# Patient Record
Sex: MALE | Race: WHITE | NOT HISPANIC OR LATINO | Employment: FULL TIME | URBAN - METROPOLITAN AREA
[De-identification: names, ages, dates, MRNs, and addresses within clinical notes are randomized per-mention and may not be internally consistent; named-entity substitution may affect disease eponyms.]

---

## 2017-07-21 ENCOUNTER — OFFICE VISIT (OUTPATIENT)
Dept: URGENT CARE | Facility: CLINIC | Age: 62
End: 2017-07-21
Payer: COMMERCIAL

## 2017-07-21 VITALS
WEIGHT: 234.5 LBS | BODY MASS INDEX: 32.83 KG/M2 | DIASTOLIC BLOOD PRESSURE: 84 MMHG | OXYGEN SATURATION: 99 % | HEIGHT: 71 IN | SYSTOLIC BLOOD PRESSURE: 140 MMHG | RESPIRATION RATE: 17 BRPM | TEMPERATURE: 98 F | HEART RATE: 105 BPM

## 2017-07-21 DIAGNOSIS — K21.9 GASTROESOPHAGEAL REFLUX DISEASE, ESOPHAGITIS PRESENCE NOT SPECIFIED: Primary | ICD-10-CM

## 2017-07-21 PROCEDURE — 99213 OFFICE O/P EST LOW 20 MIN: CPT | Mod: S$GLB,,, | Performed by: FAMILY MEDICINE

## 2017-07-21 NOTE — PROGRESS NOTES
"Subjective:       Patient ID: Tony Morin is a 62 y.o. male.    Vitals:  height is 5' 11" (1.803 m) and weight is 106.4 kg (234 lb 8 oz). His tympanic temperature is 98 °F (36.7 °C). His blood pressure is 140/84 (abnormal) and his pulse is 105. His respiration is 17 and oxygen saturation is 99%.     Chief Complaint: Gastroesophageal Reflux    Gastroesophageal Reflux   He reports no abdominal pain, no chest pain, no choking, no coughing, no hoarse voice, no nausea or no sore throat. This is a recurrent problem. The current episode started in the past 7 days. The problem occurs frequently. The problem has been unchanged. The symptoms are aggravated by certain foods and lying down. Pertinent negatives include no fatigue, muscle weakness or weight loss. There are no known risk factors. He has tried an antacid for the symptoms. The treatment provided significant relief. Past invasive treatments do not include reflux surgery.     Review of Systems   Constitution: Negative for chills, fatigue, fever and weight loss.   HENT: Negative for headaches, hoarse voice and sore throat.    Eyes: Negative for blurred vision.   Cardiovascular: Negative for chest pain.   Respiratory: Negative for choking, cough and shortness of breath.    Skin: Negative for rash.   Musculoskeletal: Negative for back pain, joint pain and muscle weakness.   Gastrointestinal: Negative for abdominal pain, diarrhea, nausea and vomiting.   Psychiatric/Behavioral: The patient is not nervous/anxious.        Objective:      Physical Exam   Constitutional: He appears well-developed.   Cardiovascular: Regular rhythm and normal heart sounds.  Tachycardia present.  Exam reveals no S3.    Pulmonary/Chest: Effort normal and breath sounds normal.   Abdominal: Soft. Normal appearance and bowel sounds are normal.   Neurological: He is alert.       Assessment:       1. Gastroesophageal reflux disease, esophagitis presence not specified        Plan:     "     Gastroesophageal reflux disease, esophagitis presence not specified    protonix 40 mg prn

## 2017-07-23 RX ORDER — PANTOPRAZOLE SODIUM 40 MG/1
40 TABLET, DELAYED RELEASE ORAL DAILY
Qty: 30 TABLET | Refills: 1 | Status: SHIPPED | OUTPATIENT
Start: 2017-07-23 | End: 2022-12-29

## 2017-12-05 ENCOUNTER — OFFICE VISIT (OUTPATIENT)
Dept: URGENT CARE | Facility: CLINIC | Age: 62
End: 2017-12-05
Payer: COMMERCIAL

## 2017-12-05 VITALS
DIASTOLIC BLOOD PRESSURE: 81 MMHG | HEIGHT: 71 IN | SYSTOLIC BLOOD PRESSURE: 120 MMHG | HEART RATE: 78 BPM | BODY MASS INDEX: 33.2 KG/M2 | OXYGEN SATURATION: 96 % | WEIGHT: 237.13 LBS

## 2017-12-05 DIAGNOSIS — S99.922A INJURY OF TOE ON LEFT FOOT, INITIAL ENCOUNTER: Primary | ICD-10-CM

## 2017-12-05 DIAGNOSIS — S92.535A CLOSED NONDISPLACED FRACTURE OF DISTAL PHALANX OF LESSER TOE OF LEFT FOOT, INITIAL ENCOUNTER: ICD-10-CM

## 2017-12-05 PROCEDURE — 99213 OFFICE O/P EST LOW 20 MIN: CPT | Mod: S$GLB,,, | Performed by: EMERGENCY MEDICINE

## 2017-12-05 NOTE — PROGRESS NOTES
"Subjective:       Patient ID: Tony Morin is a 62 y.o. male.    Vitals:  height is 5' 11" (1.803 m) and weight is 107.5 kg (237 lb 1.6 oz). His blood pressure is 120/81 and his pulse is 78. His oxygen saturation is 96%.     Chief Complaint: Toe Injury (2nd digit/left foot)    Toe Injury   This is a new problem. The current episode started yesterday. Associated symptoms include joint swelling. Pertinent negatives include no abdominal pain, chest pain, chills, fever, headaches, nausea, rash, sore throat or vomiting. The symptoms are aggravated by bending. He has tried nothing for the symptoms.     Review of Systems   Constitution: Negative for chills and fever.   HENT: Negative for sore throat.    Eyes: Negative for blurred vision.   Cardiovascular: Negative for chest pain.   Respiratory: Negative for shortness of breath.    Skin: Positive for color change. Negative for rash.   Musculoskeletal: Positive for joint pain (left foot), joint swelling and stiffness. Negative for back pain.   Gastrointestinal: Negative for abdominal pain, diarrhea, nausea and vomiting.   Neurological: Negative for headaches.   Psychiatric/Behavioral: The patient is not nervous/anxious.        Objective:      Physical Exam   Constitutional: He is oriented to person, place, and time. He appears well-developed and well-nourished. He is cooperative.  Non-toxic appearance. He does not appear ill. No distress.   HENT:   Head: Normocephalic and atraumatic.   Right Ear: Hearing, tympanic membrane, external ear and ear canal normal.   Left Ear: Hearing, tympanic membrane, external ear and ear canal normal.   Nose: No mucosal edema, rhinorrhea or nasal deformity. No epistaxis. Right sinus exhibits no maxillary sinus tenderness and no frontal sinus tenderness. Left sinus exhibits no maxillary sinus tenderness and no frontal sinus tenderness.   Mouth/Throat: Uvula is midline and mucous membranes are normal. No trismus in the jaw. Normal " dentition. No uvula swelling. No posterior oropharyngeal erythema.   Eyes: Conjunctivae and lids are normal. Right eye exhibits no discharge. Left eye exhibits no discharge. No scleral icterus.   Sclera clear bilat   Neck: Trachea normal, full passive range of motion without pain and phonation normal.   Cardiovascular: Normal rate, regular rhythm, normal heart sounds, intact distal pulses and normal pulses.    Pulmonary/Chest: Effort normal. No respiratory distress.   Abdominal: Normal appearance. He exhibits no pulsatile midline mass.   Musculoskeletal: Normal range of motion. He exhibits edema. He exhibits no deformity.        Left foot: There is tenderness and bony tenderness.        Feet:    Swelling tender distal phalanx L 2nd toe.   Feet:   Left Foot:   Skin Integrity: Positive for erythema.   Neurological: He is alert and oriented to person, place, and time. He exhibits normal muscle tone. Coordination normal.   Skin: Skin is warm, dry and intact. He is not diaphoretic. No pallor.   Psychiatric: He has a normal mood and affect. His speech is normal and behavior is normal. Judgment and thought content normal. Cognition and memory are normal.   Nursing note and vitals reviewed.      Assessment:       1. Injury of toe on left foot, initial encounter    2. Closed nondisplaced fracture of distal phalanx of lesser toe of left foot, initial encounter        Plan:         Injury of toe on left foot, initial encounter  -     X-Ray Toe 2 or More Views Left; Future; Expected date: 12/05/2017    Closed nondisplaced fracture of distal phalanx of lesser toe of left foot, initial encounter

## 2017-12-08 ENCOUNTER — TELEPHONE (OUTPATIENT)
Dept: URGENT CARE | Facility: CLINIC | Age: 62
End: 2017-12-08

## 2019-09-05 ENCOUNTER — OFFICE VISIT (OUTPATIENT)
Dept: URGENT CARE | Facility: CLINIC | Age: 64
End: 2019-09-05
Payer: COMMERCIAL

## 2019-09-05 VITALS
SYSTOLIC BLOOD PRESSURE: 114 MMHG | TEMPERATURE: 98 F | WEIGHT: 226.31 LBS | BODY MASS INDEX: 30.65 KG/M2 | HEART RATE: 92 BPM | OXYGEN SATURATION: 97 % | DIASTOLIC BLOOD PRESSURE: 76 MMHG | HEIGHT: 72 IN

## 2019-09-05 DIAGNOSIS — S89.91XA LEG INJURY, RIGHT, INITIAL ENCOUNTER: Primary | ICD-10-CM

## 2019-09-05 PROCEDURE — 3008F PR BODY MASS INDEX (BMI) DOCUMENTED: ICD-10-PCS | Mod: CPTII,S$GLB,, | Performed by: PHYSICIAN ASSISTANT

## 2019-09-05 PROCEDURE — 73552 XR FEMUR 2 VIEW RIGHT: ICD-10-PCS | Mod: RT,S$GLB,, | Performed by: RADIOLOGY

## 2019-09-05 PROCEDURE — 73552 X-RAY EXAM OF FEMUR 2/>: CPT | Mod: RT,S$GLB,, | Performed by: RADIOLOGY

## 2019-09-05 PROCEDURE — 99214 PR OFFICE/OUTPT VISIT, EST, LEVL IV, 30-39 MIN: ICD-10-PCS | Mod: S$GLB,,, | Performed by: PHYSICIAN ASSISTANT

## 2019-09-05 PROCEDURE — 99214 OFFICE O/P EST MOD 30 MIN: CPT | Mod: S$GLB,,, | Performed by: PHYSICIAN ASSISTANT

## 2019-09-05 PROCEDURE — 3008F BODY MASS INDEX DOCD: CPT | Mod: CPTII,S$GLB,, | Performed by: PHYSICIAN ASSISTANT

## 2019-09-05 RX ORDER — PREDNISONE 20 MG/1
TABLET ORAL
Qty: 20 TABLET | Refills: 0 | Status: SHIPPED | OUTPATIENT
Start: 2019-09-05 | End: 2022-01-26

## 2019-09-05 NOTE — PROGRESS NOTES
"Subjective:       Patient ID: Tony Morin is a 64 y.o. male.    Vitals:  height is 5' 11.5" (1.816 m) and weight is 102.7 kg (226 lb 4.8 oz). His oral temperature is 97.5 °F (36.4 °C). His blood pressure is 114/76 and his pulse is 92. His oxygen saturation is 97%.     Chief Complaint: Leg Injury    X1 week ago pt states he hit his right hip on the corner of a table. Pt states x6 days ago pt states pain from right hip to right buttock that shoots down to mid right leg and makes his right leg give out. Pt also complaints of pain with walking,and weakness. NO OTC medication today. Pt concerned for possible nerve damage.     Leg Pain    The incident occurred 5 to 7 days ago. The injury mechanism was a direct blow. The pain is present in the right hip, right leg and right thigh. The quality of the pain is described as shooting. The pain is at a severity of 8/10. The pain is moderate. The pain has been fluctuating since onset. Associated symptoms include an inability to bear weight and muscle weakness. He reports no foreign bodies present. The symptoms are aggravated by movement and weight bearing. The treatment provided no relief.       Constitution: Negative for chills, fatigue and fever.   HENT: Negative for congestion and sore throat.    Neck: Negative for painful lymph nodes.   Cardiovascular: Negative for chest pain and leg swelling.   Eyes: Negative for double vision and blurred vision.   Respiratory: Negative for cough and shortness of breath.    Gastrointestinal: Negative for nausea, vomiting and diarrhea.   Genitourinary: Negative for dysuria, frequency and urgency.   Musculoskeletal: Positive for pain and pain with walking. Negative for joint pain, joint swelling, muscle cramps and muscle ache.   Skin: Negative for color change, pale, rash and erythema.   Allergic/Immunologic: Negative for seasonal allergies.   Neurological: Negative for dizziness, history of vertigo, light-headedness, passing out and " headaches.   Hematologic/Lymphatic: Negative for swollen lymph nodes, easy bruising/bleeding and history of blood clots. Does not bruise/bleed easily.   Psychiatric/Behavioral: Negative for nervous/anxious, sleep disturbance and depression. The patient is not nervous/anxious.        Objective:      Physical Exam   Constitutional: He is oriented to person, place, and time. He appears well-developed and well-nourished. No distress.   HENT:   Head: Normocephalic and atraumatic.   Right Ear: External ear normal.   Left Ear: External ear normal.   Nose: Nose normal.   Eyes: Conjunctivae, EOM and lids are normal. Right eye exhibits no discharge. Left eye exhibits no discharge.   Neck: Trachea normal, full passive range of motion without pain and phonation normal.   Cardiovascular:   Pulses:       Posterior tibial pulses are 2+ on the right side, and 2+ on the left side.   Pulmonary/Chest: Effort normal. No respiratory distress.   Musculoskeletal: Normal range of motion.        Legs:  TTP. Antalgic gate.   Neurological: He is alert and oriented to person, place, and time. No sensory deficit.   Skin: Skin is warm, dry and intact. No rash noted. He is not diaphoretic. No erythema.   Psychiatric: He has a normal mood and affect. His speech is normal and behavior is normal. Judgment and thought content normal. Cognition and memory are normal.   Nursing note and vitals reviewed.      Assessment:       1. Leg injury, right, initial encounter        Plan:         Leg injury, right, initial encounter  -     XR FEMUR 2 VIEW RIGHT; Future; Expected date: 09/05/2019  Xr Femur 2 View Right    Result Date: 9/5/2019  EXAMINATION: XR FEMUR 2 VIEW RIGHT CLINICAL HISTORY: Unspecified injury of right lower leg, initial encounter TECHNIQUE: AP and lateral views of the right femur were performed. COMPARISON: None FINDINGS: Four views right femur. The right femoroacetabular joint appears intact.  No acute displaced fracture or dislocation of  the femur.  The knee appears intact.  No large knee joint effusion.     1. No acute displaced fracture or dislocation of the right femur. Electronically signed by: Yosi Alejnadra MD Date:    09/05/2019 Time:    15:01    -     Ambulatory referral/consult to Orthopedics    Other orders  -     predniSONE (DELTASONE) 20 MG tablet; Take 40mg for 2days, take 30mg for 2 days, take 20mg for 2 days, take 10mg for 2 days  Dispense: 20 tablet; Refill: 0    Discussed regular Ibuprofen and will do steroid taper. Will place referral for Ortho. Advised if no improvement in 2 weeks will need to follow up with Ortho. Patient states understanding and agrees with plan.     You must understand that you've received an Urgent Care treatment only and that you may be released before all your medical problems are known or treated. You, the patient, will arrange for follow up care as instructed.  Follow up with your PCP or specialty clinic as directed in the next 1-2 weeks if not improved or as needed.  You can call [phone] to schedule an appointment with the appropriate provider.  If your condition worsens we recommend that you receive another evaluation at the emergency room immediately or contact your primary medical clinics after hours call service to discuss your concerns.  Please return here or go to the Emergency Department for any concerns or worsening of condition.

## 2019-09-05 NOTE — PATIENT INSTRUCTIONS

## 2019-09-06 ENCOUNTER — TELEPHONE (OUTPATIENT)
Dept: ORTHOPEDICS | Facility: CLINIC | Age: 64
End: 2019-09-06

## 2019-09-08 ENCOUNTER — TELEPHONE (OUTPATIENT)
Dept: URGENT CARE | Facility: CLINIC | Age: 64
End: 2019-09-08

## 2019-09-16 ENCOUNTER — TELEPHONE (OUTPATIENT)
Dept: ORTHOPEDICS | Facility: CLINIC | Age: 64
End: 2019-09-16

## 2019-09-16 NOTE — TELEPHONE ENCOUNTER
----- Message from Nargis Napoleon sent at 9/16/2019  8:21 AM CDT -----  Contact: patient  Type: Needs Medical Advice    Who Called:  pt  Symptoms (please be specific):  Still having right leg  How long has patient had these symptoms:  Week and half    Best Call Back Number:   Additional Information: Requesting a call back from Nurse, regarding a sooner appt or advice

## 2019-09-16 NOTE — TELEPHONE ENCOUNTER
Called pt back. Advised that Dr. Butcher's first available appt is Thursday. Advised pt that if there were any cancellations for tomorrow, that I would call him. Thanks, Marva

## 2019-09-19 ENCOUNTER — OFFICE VISIT (OUTPATIENT)
Dept: ORTHOPEDICS | Facility: CLINIC | Age: 64
End: 2019-09-19
Payer: COMMERCIAL

## 2019-09-19 VITALS
BODY MASS INDEX: 30.61 KG/M2 | WEIGHT: 226 LBS | SYSTOLIC BLOOD PRESSURE: 128 MMHG | DIASTOLIC BLOOD PRESSURE: 85 MMHG | HEIGHT: 72 IN | HEART RATE: 89 BPM

## 2019-09-19 DIAGNOSIS — S76.111A RUPTURE OF RIGHT QUADRICEPS MUSCLE, INITIAL ENCOUNTER: Primary | ICD-10-CM

## 2019-09-19 PROCEDURE — 99999 PR PBB SHADOW E&M-EST. PATIENT-LVL III: CPT | Mod: PBBFAC,,, | Performed by: ORTHOPAEDIC SURGERY

## 2019-09-19 PROCEDURE — 99204 PR OFFICE/OUTPT VISIT, NEW, LEVL IV, 45-59 MIN: ICD-10-PCS | Mod: S$GLB,,, | Performed by: ORTHOPAEDIC SURGERY

## 2019-09-19 PROCEDURE — 99999 PR PBB SHADOW E&M-EST. PATIENT-LVL III: ICD-10-PCS | Mod: PBBFAC,,, | Performed by: ORTHOPAEDIC SURGERY

## 2019-09-19 PROCEDURE — 3008F BODY MASS INDEX DOCD: CPT | Mod: CPTII,S$GLB,, | Performed by: ORTHOPAEDIC SURGERY

## 2019-09-19 PROCEDURE — 3008F PR BODY MASS INDEX (BMI) DOCUMENTED: ICD-10-PCS | Mod: CPTII,S$GLB,, | Performed by: ORTHOPAEDIC SURGERY

## 2019-09-19 PROCEDURE — 1101F PT FALLS ASSESS-DOCD LE1/YR: CPT | Mod: CPTII,S$GLB,, | Performed by: ORTHOPAEDIC SURGERY

## 2019-09-19 PROCEDURE — 99204 OFFICE O/P NEW MOD 45 MIN: CPT | Mod: S$GLB,,, | Performed by: ORTHOPAEDIC SURGERY

## 2019-09-19 PROCEDURE — 1101F PR PT FALLS ASSESS DOC 0-1 FALLS W/OUT INJ PAST YR: ICD-10-PCS | Mod: CPTII,S$GLB,, | Performed by: ORTHOPAEDIC SURGERY

## 2019-09-19 RX ORDER — NAPROXEN SODIUM 220 MG
440 TABLET ORAL 2 TIMES DAILY PRN
COMMUNITY
End: 2019-09-19

## 2019-09-19 RX ORDER — MELOXICAM 15 MG/1
15 TABLET ORAL DAILY
Qty: 30 TABLET | Refills: 6 | Status: SHIPPED | OUTPATIENT
Start: 2019-09-19 | End: 2022-01-26

## 2019-09-19 RX ORDER — METHOCARBAMOL 750 MG/1
750 TABLET, FILM COATED ORAL 4 TIMES DAILY PRN
Qty: 44 TABLET | Refills: 0 | Status: SHIPPED | OUTPATIENT
Start: 2019-09-19 | End: 2019-09-29

## 2019-09-19 NOTE — LETTER
September 20, 2019      KANIKA Fu  [address] [facility] [address]           [facility]  [address] [facility] [address] [place] [address]  Phone: [phone]          Patient: Tony Morin   MR Number: [id number]   YOB: 1955   Date of Visit: 9/19/2019       Dear Annabel Javier:    Thank you for referring Tony Morin to me for evaluation. Attached you will find relevant portions of my assessment and plan of care.    If you have questions, please do not hesitate to call me. I look forward to following Tony Morin along with you.    Sincerely,    Lambert Butcher MD    Enclosure  CC:  No Recipients    If you would like to receive this communication electronically, please contact [email or web address] or [phone] to request more information on [employer] Link access.    For providers and/or their staff who would like to refer a patient to [facility], please contact us through our one-stop-shop provider referral line, Connor Clayton, at [phone].    If you feel you have received this communication in error or would no longer like to receive these types of communications, please e-mail [email or web address]

## 2019-09-19 NOTE — PROGRESS NOTES
Chief Complaint   Patient presents with    Right Femur - Pain, Injury       HPI:    This is a 64 y.o. who presents today complaining of right thigh pain for 2 weeks after striking thigh on corner of table. Pain is aching. No numbness or tingling. No associated signs or symptoms.      Past Medical History:   Diagnosis Date    Allergy     GERD (gastroesophageal reflux disease)     Hearing loss     right      Past Surgical History:   Procedure Laterality Date    HERNIA REPAIR        Current Outpatient Medications on File Prior to Visit   Medication Sig Dispense Refill    neomycin-polymyxin-dexamethasone (DEXACINE) 3.5 mg/g-10,000 unit/g-0.1 % Oint Place into the right eye every 6 (six) hours. 3.5 g 0    pantoprazole (PROTONIX) 40 MG tablet Take 1 tablet (40 mg total) by mouth once daily. 30 tablet 1    predniSONE (DELTASONE) 20 MG tablet Take 40mg for 2days, take 30mg for 2 days, take 20mg for 2 days, take 10mg for 2 days 20 tablet 0     No current facility-administered medications on file prior to visit.       Review of patient's allergies indicates:   Allergen Reactions    Penicillins Other (See Comments)     Happened as a child, unsure of reaction      Family History not pertinent   Social History     Socioeconomic History    Marital status:      Spouse name: Not on file    Number of children: Not on file    Years of education: Not on file    Highest education level: Not on file   Occupational History    Not on file   Social Needs    Financial resource strain: Not on file    Food insecurity:     Worry: Not on file     Inability: Not on file    Transportation needs:     Medical: Not on file     Non-medical: Not on file   Tobacco Use    Smoking status: Never Smoker    Smokeless tobacco: Never Used   Substance and Sexual Activity    Alcohol use: Yes     Comment: 1 or 2 a week    Drug use: No    Sexual activity: Not on file   Lifestyle    Physical activity:     Days per week: Not on file      Minutes per session: Not on file    Stress: Not on file   Relationships    Social connections:     Talks on phone: Not on file     Gets together: Not on file     Attends Oriental orthodox service: Not on file     Active member of club or organization: Not on file     Attends meetings of clubs or organizations: Not on file     Relationship status: Not on file   Other Topics Concern    Not on file   Social History Narrative    Not on file         Review of Systems:   Constitutional:  Denies fever or chills    Eyes:  Denies change in visual acuity    HENT:  Denies nasal congestion or sore throat    Respiratory:  Denies cough or shortness of breath    Cardiovascular:  Denies chest pain or edema    GI:  Denies abdominal pain, nausea, vomiting, bloody stools or diarrhea    :  Denies dysuria    Integument:  Denies rash    Neurologic:  Denies headache, focal weakness or sensory changes    Endocrine:  Denies polyuria or polydipsia    Lymphatic:  Denies swollen glands    Psychiatric:  Denies depression or anxiety       Physical Exam:    Constitutional:  Well developed, well nourished, no acute distress, non-toxic appearance    Integument:  Well hydrated, no rash    Lymphatic:  No lymphadenopathy noted    Neurologic:  Alert & oriented x 3,     Psychiatric:  Speech and behavior appropriate    Gi: abdomen soft  Eyes: EOMI   Left lower extremity  Exam performed same as contralateral side and is normal    Right lower extremity  Skin intact. No erythema or fluctuance. Overall normal alignment. Mild point TTP about the anterior proximal 1/3 quad. 4/5 quads, 3/5 hip flexors. NVI distally.       X-rays were performed today, personally reviewed by me and findings discussed with the patient.   3 views of the right femur show no fracture or dislocation      Rupture of right quadriceps muscle, initial encounter    Other orders  -     meloxicam (MOBIC) 15 MG tablet; Take 1 tablet (15 mg total) by mouth once daily.  Dispense: 30 tablet;  Refill: 6  -     methocarbamol (ROBAXIN) 750 MG Tab; Take 1 tablet (750 mg total) by mouth 4 (four) times daily as needed.  Dispense: 44 tablet; Refill: 0        Discussed HEP. RTC 4 weeks.

## 2025-01-24 ENCOUNTER — OFFICE VISIT (OUTPATIENT)
Dept: URGENT CARE | Facility: CLINIC | Age: 70
End: 2025-01-24
Payer: COMMERCIAL

## 2025-01-24 VITALS
SYSTOLIC BLOOD PRESSURE: 121 MMHG | RESPIRATION RATE: 17 BRPM | DIASTOLIC BLOOD PRESSURE: 82 MMHG | TEMPERATURE: 99 F | HEIGHT: 72 IN | OXYGEN SATURATION: 97 % | HEART RATE: 108 BPM | WEIGHT: 220 LBS | BODY MASS INDEX: 29.8 KG/M2

## 2025-01-24 DIAGNOSIS — Z20.828 EXPOSURE TO THE FLU: ICD-10-CM

## 2025-01-24 DIAGNOSIS — J10.1 INFLUENZA A: Primary | ICD-10-CM

## 2025-01-24 DIAGNOSIS — R52 BODY ACHES: ICD-10-CM

## 2025-01-24 LAB
CTP QC/QA: YES
POC MOLECULAR INFLUENZA A AGN: POSITIVE
POC MOLECULAR INFLUENZA B AGN: NEGATIVE

## 2025-01-24 PROCEDURE — 99203 OFFICE O/P NEW LOW 30 MIN: CPT | Mod: S$GLB,,, | Performed by: NURSE PRACTITIONER

## 2025-01-24 PROCEDURE — 87502 INFLUENZA DNA AMP PROBE: CPT | Mod: QW,S$GLB,, | Performed by: NURSE PRACTITIONER

## 2025-01-24 RX ORDER — OSELTAMIVIR PHOSPHATE 75 MG/1
75 CAPSULE ORAL 2 TIMES DAILY
Qty: 10 CAPSULE | Refills: 0 | Status: SHIPPED | OUTPATIENT
Start: 2025-01-24 | End: 2025-01-29

## 2025-01-24 NOTE — PATIENT INSTRUCTIONS

## 2025-01-24 NOTE — PROGRESS NOTES
"Subjective:      Patient ID: Tony Morin is a 70 y.o. male.    Vitals:  height is 5' 11.75" (1.822 m) and weight is 99.8 kg (220 lb). His oral temperature is 98.7 °F (37.1 °C). His blood pressure is 121/82 and his pulse is 108. His respiration is 17 and oxygen saturation is 97%.     Chief Complaint: Sinus Problem and Generalized Body Aches    Patient presents today with complaints of runny nose, sneezing, coughing, headache, body aches, fatigue & chills x 3 days. Patient reports family members are Flu +. Patient took Alieve this morning, and has been taking Alieve 2x daily morning and evening & drinking orange juice with minor relief.     Sinus Problem  Associated symptoms include chills, congestion, coughing and headaches. Pertinent negatives include no ear pain, neck pain, shortness of breath, sinus pressure or sneezing.     Constitution: Positive for chills and fatigue. Negative for fever.   HENT:  Positive for congestion and postnasal drip. Negative for ear pain, ear discharge, facial swelling and sinus pressure.    Neck: Negative for neck pain, neck stiffness and painful lymph nodes.   Cardiovascular: Negative.  Negative for chest pain and sob on exertion.   Eyes: Negative.  Negative for eye itching, eye pain and eye redness.   Respiratory:  Positive for cough. Negative for chest tightness, shortness of breath, wheezing and asthma.    Gastrointestinal: Negative.  Negative for abdominal pain, nausea and vomiting.   Endocrine: negative. excessive thirst.   Genitourinary: Negative.  Negative for dysuria, frequency, urgency and flank pain.   Musculoskeletal: Negative.  Negative for pain, trauma, joint pain and joint swelling.   Skin: Negative.  Negative for rash, wound, lesion and hives.   Allergic/Immunologic: Negative.  Negative for eczema, asthma, hives, itching and sneezing.   Neurological:  Positive for headaches. Negative for dizziness, passing out, disorientation and altered mental status. "   Hematologic/Lymphatic: Negative.  Negative for swollen lymph nodes.   Psychiatric/Behavioral: Negative.  Negative for altered mental status, disorientation and confusion.       Objective:     Physical Exam   Constitutional: He is oriented to person, place, and time. He appears well-developed. He is cooperative.  Non-toxic appearance. He does not appear ill. No distress.   HENT:   Head: Normocephalic and atraumatic.   Ears:   Right Ear: Hearing, tympanic membrane, external ear and ear canal normal. no impacted cerumen  Left Ear: Hearing, tympanic membrane, external ear and ear canal normal. no impacted cerumen  Nose: Mucosal edema and congestion present. No rhinorrhea or nasal deformity. No epistaxis. Right sinus exhibits no maxillary sinus tenderness and no frontal sinus tenderness. Left sinus exhibits no maxillary sinus tenderness and no frontal sinus tenderness.   Mouth/Throat: Uvula is midline, oropharynx is clear and moist and mucous membranes are normal. No trismus in the jaw. Normal dentition. No uvula swelling. No oropharyngeal exudate, posterior oropharyngeal edema, posterior oropharyngeal erythema, tonsillar abscesses or cobblestoning. Tonsils are 0 on the right. Tonsils are 0 on the left. No tonsillar exudate.   Eyes: Conjunctivae and lids are normal. No scleral icterus.   Neck: Trachea normal and phonation normal. Neck supple. No edema present. No erythema present. No neck rigidity present.   Cardiovascular: Normal rate, regular rhythm, normal heart sounds and normal pulses.   Pulmonary/Chest: Effort normal and breath sounds normal. No stridor. No respiratory distress. He has no decreased breath sounds. He has no wheezes. He has no rhonchi. He has no rales. He exhibits no tenderness.   Abdominal: Normal appearance.   Musculoskeletal: Normal range of motion.         General: No deformity. Normal range of motion.   Lymphadenopathy:     He has no cervical adenopathy.   Neurological: no focal deficit. He is  alert, oriented to person, place, and time and at baseline. He exhibits normal muscle tone. Coordination normal.   Skin: Skin is warm, dry, intact, not diaphoretic and not pale.   Psychiatric: His speech is normal and behavior is normal. Mood, judgment and thought content normal.   Nursing note and vitals reviewed.    Results for orders placed or performed in visit on 01/24/25   POCT Influenza A/B MOLECULAR    Collection Time: 01/24/25 10:13 AM   Result Value Ref Range    POC Molecular Influenza A Ag Positive (A) Negative    POC Molecular Influenza B Ag Negative Negative     Acceptable Yes          Assessment:     1. Influenza A    2. Body aches    3. Exposure to the flu        Plan:   + Flu A, patient agreed to take Tamiflu for treatment.      FOLLOWUP  Follow up if symptoms worsen or fail to improve, for PLEASE CONTACT PCP OR CONTACT THE EMERGENCY ROOM..     PATIENT INSTRUCTIONS  Patient Instructions   INSTRUCTIONS:  - Rest.  - Drink plenty of fluids.  - Take Tylenol and/or Ibuprofen as directed as needed for fever/pain.  Do not take more than the recommended dose.  - follow up with your PCP within the next 1-2 weeks as needed.  - You must understand that you have received an Urgent Care treatment only and that you may be released before all of your medical problems are known or treated.   - You, the patient, will arrange for follow up care as instructed.   - If your condition worsens or fails to improve we recommend that you receive another evaluation at the ER immediately or contact your PCP to discuss your concerns.   - You can call [phone] or [phone] to help schedule an appointment with the appropriate provider.     -If you smoke cigarettes, it would be beneficial for you to stop.         THANK YOU FOR ALLOWING ME TO PARTICIPATE IN YOUR HEALTHCARE,     Tony Hoff NP     Influenza A  -     oseltamivir (TAMIFLU) 75 MG capsule; Take 1 capsule (75 mg total) by mouth 2 (two)  times daily. for 5 days  Dispense: 10 capsule; Refill: 0    Body aches  -     POCT Influenza A/B MOLECULAR    Exposure to the flu